# Patient Record
Sex: FEMALE | Race: WHITE | NOT HISPANIC OR LATINO
[De-identification: names, ages, dates, MRNs, and addresses within clinical notes are randomized per-mention and may not be internally consistent; named-entity substitution may affect disease eponyms.]

---

## 2024-02-28 ENCOUNTER — APPOINTMENT (OUTPATIENT)
Dept: UROGYNECOLOGY | Facility: CLINIC | Age: 79
End: 2024-02-28
Payer: MEDICARE

## 2024-02-28 DIAGNOSIS — Z87.19 PERSONAL HISTORY OF OTHER DISEASES OF THE DIGESTIVE SYSTEM: ICD-10-CM

## 2024-02-28 DIAGNOSIS — R32 UNSPECIFIED URINARY INCONTINENCE: ICD-10-CM

## 2024-02-28 DIAGNOSIS — N81.10 CYSTOCELE, UNSPECIFIED: ICD-10-CM

## 2024-02-28 DIAGNOSIS — E78.5 HYPERLIPIDEMIA, UNSPECIFIED: ICD-10-CM

## 2024-02-28 PROBLEM — Z00.00 ENCOUNTER FOR PREVENTIVE HEALTH EXAMINATION: Status: ACTIVE | Noted: 2024-02-28

## 2024-02-28 PROCEDURE — 51701 INSERT BLADDER CATHETER: CPT

## 2024-02-28 PROCEDURE — 99204 OFFICE O/P NEW MOD 45 MIN: CPT | Mod: 25,57

## 2024-02-28 NOTE — OB HISTORY
[Total Preg ___] : : [unfilled] [Full Term ___] : [unfilled] (full-term) [Living ___] : [unfilled] (living) [Vaginal ___] : [unfilled] vaginal delivery(s) [Approximately ___ (Month)] : the LMP was approximately [unfilled] month(s) ago [Abnormal Pap Smear] : normal pap smear [Taking Estrogens] : is not taking estrogen replacement [Abnormal Bleeding] : without bleeding [Sexually Active] : sexually active [Very Satisfied] : very satisfied

## 2024-02-28 NOTE — PROCEDURE
[Straight Catheterization] : insertion of a straight catheter [Urinary Retention] : urinary retention [Stress Incontinence] : stress incontinence [Patient] : the patient [Clear] : clear [Culture] : culture [Urinalysis] : urinalysis [No Complications] : no complications [Tolerated Well] : the patient tolerated the procedure well [Post procedure instructions and information given] : Post procedure instructions and information were given and reviewed with patient. [0] : 0 [Good Fit] : fits well [Refit] : refit is not needed [Erosion] : no evidence of erosion [Erythema] : erythema noted [Discharge] : no vaginal discharge [Pessary Inserted] : inserted [Infection] : no evidence of infection [Pessary Washed] : washed [H2O] : H2O [None] : no bleeding [Straight Cath Performed] : a straight catheterization was performed to obtain the residual volume [Residual Volume ___] : the final residual volume was [unfilled] cc [FreeTextEntry1] : ring with  support size 6 [FreeTextEntry8] : pt self mgmt - desires surgery

## 2024-02-28 NOTE — HISTORY OF PRESENT ILLNESS
[FreeTextEntry1] : The patient is a 78 year P2 with complaints of pop  and ui  started 20 y ago wearing pessary  self mgmt She   urinary leakage occ wears liners She feels complete bladder emptying sometimes She voids every _7_d ____ volume. 1-3nocturia.  Her liquid intake consists of tea, water 7 glasses and social drinking wine She has a BM q daily  incomplete emptying She denies gross hematuria, hx of nephrolithiasis, vaginal bulge or recurrent UTIs Her last pap was 15   The last time she has intercourse was this week Denies abdominal surgical history She has no history of bruising, excessive bleeding, peteciae.

## 2024-02-28 NOTE — REASON FOR VISIT
[Initial Visit ___] : an initial visit for [unfilled] [Questionnaire Received] : Patient questionnaire received [Intake Form Reviewed] : Patient intake form with past medical history, surgical history, family history and social history reviewed today [Urinary Incontinence] : urinary incontinence [Pessary] : pessary [Pelvic Organ Prolapse] : pelvic organ prolapse

## 2024-02-28 NOTE — PHYSICAL EXAM
[Chaperone Present] : A chaperone was present in the examining room during all aspects of the physical examination [FreeTextEntry1] : [place] [No Acute Distress] : in acute distress [Well developed] : well developed [Well Nourished] : ~L well nourished [Good Hygeine] : demonstrates good hygeine [Oriented x3] : oriented to person, place, and time [Normal Memory] : ~T memory was ~L unimpaired [Normal Mood/Affect] : mood and affect are normal [Anxiety] : patient is not anxious [Normal sensory exam] : sensory exam was normal [Normal Lung Sounds] : the lungs were clear to auscultation [Respirations regular] : ~T respiratory rate was regular [Cough] : no cough [Dyspnea] : no dyspnea [Rate & Rhythm Regular] : ~T heart rate and rhythm were normal [No Edema] : ~T edema was present [Varicose Veins] : no varicose veins observed [Murmurs] : no murmurs were heard [Mass (___ Cm)] : no ~M [unfilled] abdominal mass was palpated [Tenderness] : ~T no ~M abdominal tenderness observed [Distended] : not distended [H/Smegaly] : no hepatosplenomegaly [Hernia] : no hernia observed [Scar] : no scars [Warm and Dry] : was warm and dry to touch [No Lesions] : no lesions were seen on the external genitalia [Labia Majora] : were normal [Labia Minora] : were normal [Bartholin's Gland] : both Bartholin's glands were normal  [Normal Appearance] : general appearance was normal [Atrophy] : atrophy [Cystocele] : a cystocele [Enterocele] : an enterocele [Uterine Prolapse] : uterine prolapse [No Bleeding] : there was no active vaginal bleeding [Aa ____] : Aa [unfilled] [Ba ____] : Ba [unfilled] [C ____] : C [unfilled] [GH ____] : GH [unfilled] [PB ____] : PB [unfilled] [TVL ____] : TVL  [unfilled] [Ap ____] : Ap [unfilled] [Bp ____] : Bp [unfilled] [D ____] : D [unfilled] [] : II [Normal] : normal [Normal Position] : in a normal position [Soft] :  the cervix was soft [Post Void Residual ____ml] : post void residual was [unfilled] ml [Uterine Adnexae] : were not tender and not enlarged [de-identified] : 120cc

## 2024-02-28 NOTE — DISCUSSION/SUMMARY
[FreeTextEntry1] : 78yp2 stage 3 pop and UI  desires surgery  very  active 78 y  no  signficant med prob and no abd surgery  rtc for urd reviewed pop We reviewed management options for her prolapse including: observation, pelvic floor exercises with and without PT, pessary, and surgical management. We reviewed various surgical management options including vaginal, laparoscopic/robotic, abdominal procedures. We reviewed procedures involving hysterectomy as well as uterine sparing procedures. We also reviewed both mesh and non-mesh options. Written IUGA information on prolapse treatment options was also provided to her to review.   recn lsc lashawn colpopexy

## 2024-02-29 LAB
APPEARANCE: ABNORMAL
BACTERIA: NEGATIVE /HPF
BILIRUBIN URINE: NEGATIVE
BLOOD URINE: NEGATIVE
CAST: 0 /LPF
COLOR: YELLOW
EPITHELIAL CELLS: 0 /HPF
GLUCOSE QUALITATIVE U: NEGATIVE MG/DL
KETONES URINE: NEGATIVE MG/DL
LEUKOCYTE ESTERASE URINE: NEGATIVE
MICROSCOPIC-UA: NORMAL
NITRITE URINE: NEGATIVE
PH URINE: 6.5
PROTEIN URINE: NEGATIVE MG/DL
RED BLOOD CELLS URINE: 0 /HPF
SPECIFIC GRAVITY URINE: 1.01
UROBILINOGEN URINE: 0.2 MG/DL
WHITE BLOOD CELLS URINE: 0 /HPF

## 2024-03-04 PROBLEM — N39.0 ACUTE UTI: Status: ACTIVE | Noted: 2024-03-04

## 2024-03-04 LAB — BACTERIA UR CULT: ABNORMAL

## 2024-03-07 ENCOUNTER — APPOINTMENT (OUTPATIENT)
Dept: UROGYNECOLOGY | Facility: CLINIC | Age: 79
End: 2024-03-07

## 2024-03-07 DIAGNOSIS — N39.0 URINARY TRACT INFECTION, SITE NOT SPECIFIED: ICD-10-CM

## 2024-03-19 ENCOUNTER — APPOINTMENT (OUTPATIENT)
Dept: UROGYNECOLOGY | Facility: CLINIC | Age: 79
End: 2024-03-19

## 2024-03-19 ENCOUNTER — APPOINTMENT (OUTPATIENT)
Dept: UROGYNECOLOGY | Facility: CLINIC | Age: 79
End: 2024-03-19
Payer: MEDICARE

## 2024-03-19 VITALS
WEIGHT: 125 LBS | SYSTOLIC BLOOD PRESSURE: 124 MMHG | HEART RATE: 82 BPM | DIASTOLIC BLOOD PRESSURE: 75 MMHG | OXYGEN SATURATION: 97 %

## 2024-03-19 DIAGNOSIS — R33.9 RETENTION OF URINE, UNSPECIFIED: ICD-10-CM

## 2024-03-19 PROCEDURE — 51797 INTRAABDOMINAL PRESSURE TEST: CPT

## 2024-03-19 PROCEDURE — 51741 ELECTRO-UROFLOWMETRY FIRST: CPT

## 2024-03-19 PROCEDURE — 51798 US URINE CAPACITY MEASURE: CPT

## 2024-03-19 PROCEDURE — 51784 ANAL/URINARY MUSCLE STUDY: CPT

## 2024-03-19 PROCEDURE — 51729 CYSTOMETROGRAM W/VP&UP: CPT

## 2024-03-20 PROBLEM — R33.9 INCOMPLETE EMPTYING OF BLADDER: Status: ACTIVE | Noted: 2024-03-20

## 2024-03-29 ENCOUNTER — APPOINTMENT (OUTPATIENT)
Dept: UROGYNECOLOGY | Facility: CLINIC | Age: 79
End: 2024-03-29

## 2024-04-02 ENCOUNTER — APPOINTMENT (OUTPATIENT)
Dept: UROGYNECOLOGY | Facility: CLINIC | Age: 79
End: 2024-04-02
Payer: MEDICARE

## 2024-04-02 LAB — BACTERIA UR CULT: ABNORMAL

## 2024-04-02 PROCEDURE — 99213 OFFICE O/P EST LOW 20 MIN: CPT

## 2024-04-02 NOTE — ASSESSMENT
[FreeTextEntry1] : 78y hx if stage 3 pop  very  symptomatic desires surgery   plan for  lsc lashawn colpopexy    may 8th for fu pending med clearance  next visit  check  pap  gave preop and post op onstructions    review rbjayden

## 2024-04-02 NOTE — HISTORY OF PRESENT ILLNESS
[FreeTextEntry1] : Patient is a 78 F with Stage 3 prolapse here for pre-surgical counseling.  Patient denies any new complaints.  She continues to desire surgical management.  Her pre-operative workup is as follows:  UDS (): [id number], +CST namL, none DO, PVR 250cccc. Pelvic US pending results  Pap neg hx negative for intraepithelial lesion or malignancy  PMH: dyslipidemia  ibs PSH: none

## 2024-04-18 ENCOUNTER — APPOINTMENT (OUTPATIENT)
Dept: INTERNAL MEDICINE | Facility: CLINIC | Age: 79
End: 2024-04-18
Payer: MEDICARE

## 2024-04-18 VITALS
WEIGHT: 125 LBS | HEART RATE: 77 BPM | DIASTOLIC BLOOD PRESSURE: 76 MMHG | BODY MASS INDEX: 18.94 KG/M2 | TEMPERATURE: 97.2 F | HEIGHT: 68 IN | OXYGEN SATURATION: 95 % | SYSTOLIC BLOOD PRESSURE: 116 MMHG

## 2024-04-18 DIAGNOSIS — Z01.818 ENCOUNTER FOR OTHER PREPROCEDURAL EXAMINATION: ICD-10-CM

## 2024-04-18 PROCEDURE — G2211 COMPLEX E/M VISIT ADD ON: CPT

## 2024-04-18 PROCEDURE — 36415 COLL VENOUS BLD VENIPUNCTURE: CPT

## 2024-04-18 PROCEDURE — 99204 OFFICE O/P NEW MOD 45 MIN: CPT

## 2024-04-18 RX ORDER — SULFAMETHOXAZOLE AND TRIMETHOPRIM 800; 160 MG/1; MG/1
800-160 TABLET ORAL TWICE DAILY
Qty: 10 | Refills: 0 | Status: DISCONTINUED | COMMUNITY
Start: 2024-04-02 | End: 2024-04-18

## 2024-04-18 RX ORDER — SULFAMETHOXAZOLE AND TRIMETHOPRIM 800; 160 MG/1; MG/1
800-160 TABLET ORAL
Qty: 6 | Refills: 11 | Status: DISCONTINUED | COMMUNITY
Start: 2024-03-04 | End: 2024-04-18

## 2024-04-18 NOTE — HISTORY OF PRESENT ILLNESS
[No Pertinent Cardiac History] : no history of aortic stenosis, atrial fibrillation, coronary artery disease, recent myocardial infarction, or implantable device/pacemaker [No Pertinent Pulmonary History] : no history of asthma, COPD, sleep apnea, or smoking [No Adverse Anesthesia Reaction] : no adverse anesthesia reaction in self or family member [(Patient denies any chest pain, claudication, dyspnea on exertion, orthopnea, palpitations or syncope)] : Patient denies any chest pain, claudication, dyspnea on exertion, orthopnea, palpitations or syncope [Good (7-10 METs)] : Good (7-10 METs) [Chronic Anticoagulation] : no chronic anticoagulation [Chronic Kidney Disease] : no chronic kidney disease [Diabetes] : no diabetes [FreeTextEntry1] : hystrectomy [FreeTextEntry2] : 4/13/2024 [FreeTextEntry3] : Dr. Silva [FreeTextEntry4] : RCRI - Class 2 risk - 6% 30 day risk of death, MI or cardiac arrest Manpreet kam operative risk - .1% risk of MI or cardiac arrest, intraoperatively or upto 30 days post op METS>4

## 2024-04-18 NOTE — PHYSICAL EXAM
[No Acute Distress] : no acute distress [Well Nourished] : well nourished [Well Developed] : well developed [Well-Appearing] : well-appearing [Normal Sclera/Conjunctiva] : normal sclera/conjunctiva [PERRL] : pupils equal round and reactive to light [EOMI] : extraocular movements intact [Normal Outer Ear/Nose] : the outer ears and nose were normal in appearance [Normal Oropharynx] : the oropharynx was normal [No JVD] : no jugular venous distention [No Lymphadenopathy] : no lymphadenopathy [Supple] : supple [No Respiratory Distress] : no respiratory distress  [No Accessory Muscle Use] : no accessory muscle use [Clear to Auscultation] : lungs were clear to auscultation bilaterally [Normal Rate] : normal rate  [Regular Rhythm] : with a regular rhythm [Normal S1, S2] : normal S1 and S2 [No Murmur] : no murmur heard [Soft] : abdomen soft [Non Tender] : non-tender [Non-distended] : non-distended [No Masses] : no abdominal mass palpated [No HSM] : no HSM [Normal Bowel Sounds] : normal bowel sounds [Normal Posterior Cervical Nodes] : no posterior cervical lymphadenopathy [Normal Anterior Cervical Nodes] : no anterior cervical lymphadenopathy [No CVA Tenderness] : no CVA  tenderness [No Spinal Tenderness] : no spinal tenderness [No Joint Swelling] : no joint swelling [Grossly Normal Strength/Tone] : grossly normal strength/tone [No Rash] : no rash [Coordination Grossly Intact] : coordination grossly intact [No Focal Deficits] : no focal deficits [Normal Gait] : normal gait [Normal Affect] : the affect was normal [Alert and Oriented x3] : oriented to person, place, and time [Normal Insight/Judgement] : insight and judgment were intact

## 2024-04-18 NOTE — ASSESSMENT
[Patient Optimized for Surgery] : Patient optimized for surgery [No Further Testing Recommended] : no further testing recommended [As per surgery] : as per surgery [FreeTextEntry4] :  if the labs and EKG are within normal limits, patient is low risk for low to moderate risk procedure.  She is not taking any medications.

## 2024-04-19 LAB
ABO + RH PNL BLD: NORMAL
APPEARANCE: CLEAR
APTT BLD: 36 SEC
BACTERIA: NEGATIVE /HPF
BILIRUBIN URINE: NEGATIVE
BLOOD URINE: NEGATIVE
CAST: 0 /LPF
COLOR: YELLOW
EPITHELIAL CELLS: 2 /HPF
GLUCOSE QUALITATIVE U: NEGATIVE MG/DL
INR PPP: 0.92 RATIO
KETONES URINE: NEGATIVE MG/DL
LEUKOCYTE ESTERASE URINE: ABNORMAL
MICROSCOPIC-UA: NORMAL
NITRITE URINE: NEGATIVE
PH URINE: 6.5
PROTEIN URINE: NEGATIVE MG/DL
PT BLD: 10.4 SEC
RED BLOOD CELLS URINE: 0 /HPF
SPECIFIC GRAVITY URINE: 1.01
UROBILINOGEN URINE: 0.2 MG/DL
WHITE BLOOD CELLS URINE: 1 /HPF

## 2024-04-22 LAB — BACTERIA UR CULT: NORMAL

## 2024-05-08 ENCOUNTER — APPOINTMENT (OUTPATIENT)
Dept: UROGYNECOLOGY | Facility: CLINIC | Age: 79
End: 2024-05-08
Payer: MEDICARE

## 2024-05-08 VITALS — OXYGEN SATURATION: 96 % | SYSTOLIC BLOOD PRESSURE: 120 MMHG | DIASTOLIC BLOOD PRESSURE: 69 MMHG | HEART RATE: 82 BPM

## 2024-05-08 DIAGNOSIS — N81.2 INCOMPLETE UTEROVAGINAL PROLAPSE: ICD-10-CM

## 2024-05-08 PROCEDURE — 99214 OFFICE O/P EST MOD 30 MIN: CPT

## 2024-05-08 NOTE — PROCEDURE
[Good Fit] : fits well [Pessary Inserted] : inserted [None] : no bleeding [FreeTextEntry1] : ring with support  [FreeTextEntry8] : rec to remove pesary  on may 10th prior to surgery

## 2024-05-08 NOTE — ASSESSMENT
[FreeTextEntry1] : 78 y    stage 3 pop   plan for lsc schcolpopexy  bs  poss apr poss open  dw rba of the surgery  denies  uti sx   per patient she had sonogram

## 2024-05-08 NOTE — HISTORY OF PRESENT ILLNESS
[FreeTextEntry1] : 78y p2 with  hx of stage 3 pop  used pessary  for several  years  desires  surgery  specfically  lashawn bs colpopexy  presents for preoperative counseling. We reviewed management options for her prolapse including: observation, pelvic floor exercises, pessary, surgical management. We reviewed various surgical management options including vaginal, laparoscopic/robotic, abdominal procedures. We also reviewed both mesh and non-mesh options. She continues to be interested in lsc supracervical hysterectomy with sacral colpopexy. We discussed her URD findings of    She denies a history of abnormal Pap smears in the past and a recent Pap smear is negative. She is aware that her cervix will be left in situ and she will require continued routine screening. We discussed the risks and benefits of removing her ovaries at the time of surgery and she would like to keep her ovaries in situ. she would like to proceed with a bilateral salpingectomy at the time of the procedure. we reviewed risks of the surgery including but not limited to: bleeding, infection, pain, urinary retention, persistence or worsening of stress urinary incontinence, development of urge incontinence, voiding dysfunction, mesh erosion, failure to reduce prolapse, prolapse recurrence, dyspareunia, and vaginal shortening. we discussed the risk of laparotomy (having to make an incision). We discussed the risk of injury to her bladder, ureters, urethra, vagina, rectum and bowel. We discussed the risk of mesh erosion, fistula formation and neuropathy. The risks and procedure details were explained in layman's terms and she expressed understanding of all of these risks. She understands that mesh will be used and we reviewed the FDA notification on transvaginal mesh. A separate mesh specific consent form with procedure risks and mesh risks was reviewed and initialed by myself and the patient. This was witnessed and signed.   We discussed the elective nature of the surgery and we discussed that she is choosing to undergo this surgery despite awareness and understanding of procedure risks. She was counseled on alternative treatment options. She continues to desire procedure.    We reviewed her hospital stay as well as preoperative and postoperative instructions. She is aware that she must be NPO after midnight prior to the surgery.     Indications for procedure: POP, occasional positional (standing) UI  ml Patient wearing pessary on arrival.   Cystometrogram: Test was performed with the patient in a sitting position and via single catheter in the vagina  Bladder residual volume was 250 ml.     Findings: First urge: 26 ml.  Fullness: 101 ml.  jail: 400 ml.   Involuntary Detrusor Contraction:. no overactivity.   Cough Stress Test: Test was performed with patient in a sitting position.  Pelvic prolapse was reduced with pessary     200ml: negative at cough 128/ valsalva 70 cm.   300ml: negative at cough cm.   400ml: negative at cough 124/ valsalva 84 cm. Stress test also performed standing at 400 ml fill. negative stress test with cough or valsalva.   Pressure Voiding Study: Volume at the start of the study was 400 ml.   Detrusor contraction was observed at 26 cm H2O with catheter in place.   Maximum flow rate was 9.9 ml/min with catheters in place.   The volume voided spontaneously and without maneuvers was 201 ml with catheters in place. The final PVR was 200 ml.   EMG:. Response to fill showed no change. Response to cough was increased. Response to void was increased.   URODYNAMIC FINDINGS: Incomplete bladder emptying Pelvic Prolaspe Intermittant urine stream

## 2024-05-08 NOTE — DISCUSSION/SUMMARY
[Reviewed Clinical Lab Test(s)] : Results of clinical tests were reviewed. [Discuss Alternatives/Risks/Benefits w/Patient] : All alternatives, risks, and benefits were discussed with the patient/family and all questions were answered.  Patient expressed good understanding and appreciates the importance of follow up as recommended. [FreeTextEntry1] : awaiting pelvic sonogram ucx

## 2024-05-08 NOTE — PHYSICAL EXAM
[Chaperone Declined] : Patient declined chaperone [No Acute Distress] : in acute distress [Well developed] : well developed [Well Nourished] : ~L well nourished [Good Hygeine] : demonstrates good hygeine [Oriented x3] : oriented to person, place, and time [Normal Memory] : ~T memory was ~L unimpaired [Normal Mood/Affect] : mood and affect are normal [Anxiety] : patient is anxious [Normal Lung Sounds] : the lungs were clear to auscultation [Respirations regular] : ~T respiratory rate was regular [Cough] : no cough [Dyspnea] : no dyspnea [Rate & Rhythm Regular] : ~T heart rate and rhythm were normal [No Edema] : ~T edema was not present [Murmurs] : a ~P ~M murmur was heard [Varicose Veins] : varicose veins observed [Mass (___ Cm)] : no ~M [unfilled] abdominal mass was palpated [Tenderness] : ~T no ~M abdominal tenderness observed [Distended] : not distended [H/Smegaly] : no hepatosplenomegaly [Hernia] : no hernia observed [Scar] : no scars [No Lesions] : no lesions were seen on the external genitalia [Labia Majora] : were normal [Labia Minora] : were normal [Bartholin's Gland] : both Bartholin's glands were normal  [Normal] : normal [FreeTextEntry4] : pesary  removed and reinserted no erosions  [de-identified] : stage 3

## 2024-05-10 NOTE — ASU PATIENT PROFILE, ADULT - NSICDXPASTMEDICALHX_GEN_ALL_CORE_FT
PAST MEDICAL HISTORY:  Cystocele with prolapse     Dyslipidemia     History of IBS     Uterovaginal prolapse

## 2024-05-10 NOTE — ASU PATIENT PROFILE, ADULT - NSICDXPASTSURGICALHX_GEN_ALL_CORE_FT
PAST SURGICAL HISTORY:  History of back surgery BROKEN SPINE-LOWER    History of hip surgery LEFT BROKEN HIP

## 2024-05-12 ENCOUNTER — TRANSCRIPTION ENCOUNTER (OUTPATIENT)
Age: 79
End: 2024-05-12

## 2024-05-13 ENCOUNTER — RESULT REVIEW (OUTPATIENT)
Age: 79
End: 2024-05-13

## 2024-05-13 ENCOUNTER — APPOINTMENT (OUTPATIENT)
Dept: UROGYNECOLOGY | Facility: HOSPITAL | Age: 79
End: 2024-05-13

## 2024-05-13 ENCOUNTER — INPATIENT (INPATIENT)
Facility: HOSPITAL | Age: 79
LOS: 0 days | Discharge: HOME | End: 2024-05-14
Attending: OBSTETRICS & GYNECOLOGY | Admitting: OBSTETRICS & GYNECOLOGY
Payer: MEDICARE

## 2024-05-13 ENCOUNTER — TRANSCRIPTION ENCOUNTER (OUTPATIENT)
Age: 79
End: 2024-05-13

## 2024-05-13 VITALS
HEART RATE: 90 BPM | WEIGHT: 123.46 LBS | TEMPERATURE: 97 F | OXYGEN SATURATION: 97 % | DIASTOLIC BLOOD PRESSURE: 80 MMHG | HEIGHT: 68 IN | RESPIRATION RATE: 16 BRPM | SYSTOLIC BLOOD PRESSURE: 138 MMHG

## 2024-05-13 DIAGNOSIS — Z98.890 OTHER SPECIFIED POSTPROCEDURAL STATES: Chronic | ICD-10-CM

## 2024-05-13 LAB
BLD GP AB SCN SERPL QL: NEGATIVE — SIGNIFICANT CHANGE UP
GLUCOSE BLDC GLUCOMTR-MCNC: 91 MG/DL — SIGNIFICANT CHANGE UP (ref 70–99)
RH IG SCN BLD-IMP: POSITIVE — SIGNIFICANT CHANGE UP

## 2024-05-13 PROCEDURE — 58542 LSH W/T/O UT 250 G OR LESS: CPT

## 2024-05-13 PROCEDURE — 88307 TISSUE EXAM BY PATHOLOGIST: CPT | Mod: 26

## 2024-05-13 PROCEDURE — 57250 REPAIR RECTUM & VAGINA: CPT

## 2024-05-13 PROCEDURE — 57425 LAPAROSCOPY SURG COLPOPEXY: CPT

## 2024-05-13 PROCEDURE — 88342 IMHCHEM/IMCYTCHM 1ST ANTB: CPT | Mod: 26

## 2024-05-13 PROCEDURE — 88304 TISSUE EXAM BY PATHOLOGIST: CPT | Mod: 26

## 2024-05-13 PROCEDURE — 88305 TISSUE EXAM BY PATHOLOGIST: CPT | Mod: 26

## 2024-05-13 DEVICE — INTERCEED 3 X 4"
Type: IMPLANTABLE DEVICE | Status: NON-FUNCTIONAL
Removed: 2024-05-13

## 2024-05-13 DEVICE — MESH UPSYLON Y
Type: IMPLANTABLE DEVICE | Status: NON-FUNCTIONAL
Removed: 2024-05-13

## 2024-05-13 DEVICE — COVIDIEN PROTACK FIXATION DEVICE
Type: IMPLANTABLE DEVICE | Status: NON-FUNCTIONAL
Removed: 2024-05-13

## 2024-05-13 RX ORDER — SODIUM CHLORIDE 9 MG/ML
1000 INJECTION, SOLUTION INTRAVENOUS
Refills: 0 | Status: DISCONTINUED | OUTPATIENT
Start: 2024-05-13 | End: 2024-05-14

## 2024-05-13 RX ORDER — METOCLOPRAMIDE HCL 10 MG
10 TABLET ORAL EVERY 6 HOURS
Refills: 0 | Status: DISCONTINUED | OUTPATIENT
Start: 2024-05-13 | End: 2024-05-14

## 2024-05-13 RX ORDER — ONDANSETRON 8 MG/1
8 TABLET, FILM COATED ORAL EVERY 6 HOURS
Refills: 0 | Status: DISCONTINUED | OUTPATIENT
Start: 2024-05-13 | End: 2024-05-14

## 2024-05-13 RX ORDER — HEPARIN SODIUM 5000 [USP'U]/ML
5000 INJECTION INTRAVENOUS; SUBCUTANEOUS ONCE
Refills: 0 | Status: COMPLETED | OUTPATIENT
Start: 2024-05-13 | End: 2024-05-13

## 2024-05-13 RX ORDER — OXYCODONE HYDROCHLORIDE 5 MG/1
10 TABLET ORAL EVERY 6 HOURS
Refills: 0 | Status: DISCONTINUED | OUTPATIENT
Start: 2024-05-13 | End: 2024-05-14

## 2024-05-13 RX ORDER — OXYCODONE HYDROCHLORIDE 5 MG/1
5 TABLET ORAL EVERY 6 HOURS
Refills: 0 | Status: DISCONTINUED | OUTPATIENT
Start: 2024-05-13 | End: 2024-05-14

## 2024-05-13 RX ORDER — HYDROMORPHONE HYDROCHLORIDE 2 MG/ML
0.2 INJECTION INTRAMUSCULAR; INTRAVENOUS; SUBCUTANEOUS
Refills: 0 | Status: DISCONTINUED | OUTPATIENT
Start: 2024-05-13 | End: 2024-05-14

## 2024-05-13 RX ORDER — IBUPROFEN 200 MG
600 TABLET ORAL EVERY 6 HOURS
Refills: 0 | Status: DISCONTINUED | OUTPATIENT
Start: 2024-05-13 | End: 2024-05-14

## 2024-05-13 RX ORDER — CELECOXIB 200 MG/1
400 CAPSULE ORAL ONCE
Refills: 0 | Status: COMPLETED | OUTPATIENT
Start: 2024-05-13 | End: 2024-05-13

## 2024-05-13 RX ORDER — ACETAMINOPHEN 500 MG
1000 TABLET ORAL EVERY 6 HOURS
Refills: 0 | Status: DISCONTINUED | OUTPATIENT
Start: 2024-05-13 | End: 2024-05-14

## 2024-05-13 RX ORDER — KETOROLAC TROMETHAMINE 30 MG/ML
15 SYRINGE (ML) INJECTION EVERY 6 HOURS
Refills: 0 | Status: COMPLETED | OUTPATIENT
Start: 2024-05-13 | End: 2024-05-14

## 2024-05-13 RX ORDER — ACETAMINOPHEN 500 MG
1000 TABLET ORAL ONCE
Refills: 0 | Status: COMPLETED | OUTPATIENT
Start: 2024-05-13 | End: 2024-05-13

## 2024-05-13 RX ORDER — SIMETHICONE 80 MG/1
80 TABLET, CHEWABLE ORAL EVERY 6 HOURS
Refills: 0 | Status: DISCONTINUED | OUTPATIENT
Start: 2024-05-13 | End: 2024-05-14

## 2024-05-13 RX ADMIN — HEPARIN SODIUM 5000 UNIT(S): 5000 INJECTION INTRAVENOUS; SUBCUTANEOUS at 11:50

## 2024-05-13 RX ADMIN — CELECOXIB 400 MILLIGRAM(S): 200 CAPSULE ORAL at 11:50

## 2024-05-13 RX ADMIN — SODIUM CHLORIDE 125 MILLILITER(S): 9 INJECTION, SOLUTION INTRAVENOUS at 19:32

## 2024-05-13 RX ADMIN — Medication 1000 MILLIGRAM(S): at 11:50

## 2024-05-13 NOTE — H&P ADULT - ASSESSMENT
POD0 s/p l/s NIESHA BS, sacrocolpopexy, intercede, posterior rectocele repair, cysto w/ jets b/l, methylene blue bladder backfill    IN 2100        Admit overnight for monitoring  Will complete post-op check in 4 hours

## 2024-05-13 NOTE — BRIEF OPERATIVE NOTE - NSICDXBRIEFPROCEDURE_GEN_ALL_CORE_FT
PROCEDURES:  Laparoscopic supracervical hysterectomy with salpingectomy, sacrocolpopexy, and cystoscopy 13-May-2024 18:39:30  Karey Garner  Posterior repair 13-May-2024 18:39:40  Karey Garner

## 2024-05-13 NOTE — PRE-ANESTHESIA EVALUATION ADULT - NSANTHDIETYNSD_GEN_ALL_CORE
Pt discharge RN Note    RN at bedside. Pt given discharge instructions and MD follow up instructions. Pt verbalized understanding. All questions answered. Pt discharged via wheelchair in stable condition.    Yes

## 2024-05-13 NOTE — BRIEF OPERATIVE NOTE - NSICDXBRIEFPREOP_GEN_ALL_CORE_FT
PRE-OP DIAGNOSIS:  Uterine prolapse 13-May-2024 18:39:53  Karey Garner  Urinary, incontinence, stress female 13-May-2024 18:40:26  Karey Garner   Never

## 2024-05-13 NOTE — BRIEF OPERATIVE NOTE - OPERATION/FINDINGS
Chlorhexidine prep - abdominal and vaginal. Umbilical port 10mm closed at end of case with vicryl via Granny needle. R and L mid abdominal ports placed under direct visualization. Lower R pelvic port placed under direct visualization 10mm also closed with vicryl at end of case.   L/s NIESHA BS completed with no difficulty. R ureter visualized throughout the case. Methylene blue used to distend the bladder. Removed via endocatch 10mm bad at end of case.   Sacrocolpopexy completed with mesh. Intercede used for adhesion prophylaxis.   Posterior rectocele repair noted and repaired with vicryl suture. Uncomplicated. Orourke left in place.

## 2024-05-13 NOTE — H&P ADULT - HISTORY OF PRESENT ILLNESS
77yo w/ stage III prolapse + cystocele x 15 years with bulging and pressure symptoms    OBhx:   x 2  GYNHx: no fibroids.cysts, abnml paps, STI  NKDA  PMH: denies  Meds: none  SxHx: L femur orif , kyphoplasty   SH: denies

## 2024-05-14 ENCOUNTER — TRANSCRIPTION ENCOUNTER (OUTPATIENT)
Age: 79
End: 2024-05-14

## 2024-05-14 VITALS
RESPIRATION RATE: 18 BRPM | SYSTOLIC BLOOD PRESSURE: 109 MMHG | DIASTOLIC BLOOD PRESSURE: 52 MMHG | OXYGEN SATURATION: 95 % | TEMPERATURE: 99 F | HEART RATE: 73 BPM

## 2024-05-14 PROBLEM — E78.5 HYPERLIPIDEMIA, UNSPECIFIED: Chronic | Status: ACTIVE | Noted: 2024-05-10

## 2024-05-14 PROBLEM — Z87.19 PERSONAL HISTORY OF OTHER DISEASES OF THE DIGESTIVE SYSTEM: Chronic | Status: ACTIVE | Noted: 2024-05-10

## 2024-05-14 PROBLEM — N81.4 UTEROVAGINAL PROLAPSE, UNSPECIFIED: Chronic | Status: ACTIVE | Noted: 2024-05-10

## 2024-05-14 LAB
ANION GAP SERPL CALC-SCNC: 9 MMOL/L — SIGNIFICANT CHANGE UP (ref 5–17)
BASOPHILS # BLD AUTO: 0.01 K/UL — SIGNIFICANT CHANGE UP (ref 0–0.2)
BASOPHILS NFR BLD AUTO: 0.1 % — SIGNIFICANT CHANGE UP (ref 0–2)
BUN SERPL-MCNC: 10 MG/DL — SIGNIFICANT CHANGE UP (ref 7–23)
CALCIUM SERPL-MCNC: 9.6 MG/DL — SIGNIFICANT CHANGE UP (ref 8.4–10.5)
CHLORIDE SERPL-SCNC: 100 MMOL/L — SIGNIFICANT CHANGE UP (ref 96–108)
CO2 SERPL-SCNC: 26 MMOL/L — SIGNIFICANT CHANGE UP (ref 22–31)
CREAT SERPL-MCNC: 0.78 MG/DL — SIGNIFICANT CHANGE UP (ref 0.5–1.3)
EGFR: 78 ML/MIN/1.73M2 — SIGNIFICANT CHANGE UP
EOSINOPHIL # BLD AUTO: 0 K/UL — SIGNIFICANT CHANGE UP (ref 0–0.5)
EOSINOPHIL NFR BLD AUTO: 0 % — SIGNIFICANT CHANGE UP (ref 0–6)
GLUCOSE SERPL-MCNC: 108 MG/DL — HIGH (ref 70–99)
HCT VFR BLD CALC: 38.1 % — SIGNIFICANT CHANGE UP (ref 34.5–45)
HGB BLD-MCNC: 12 G/DL — SIGNIFICANT CHANGE UP (ref 11.5–15.5)
IMM GRANULOCYTES NFR BLD AUTO: 0.3 % — SIGNIFICANT CHANGE UP (ref 0–0.9)
LYMPHOCYTES # BLD AUTO: 1.22 K/UL — SIGNIFICANT CHANGE UP (ref 1–3.3)
LYMPHOCYTES # BLD AUTO: 8.4 % — LOW (ref 13–44)
MCHC RBC-ENTMCNC: 29.9 PG — SIGNIFICANT CHANGE UP (ref 27–34)
MCHC RBC-ENTMCNC: 31.5 GM/DL — LOW (ref 32–36)
MCV RBC AUTO: 95 FL — SIGNIFICANT CHANGE UP (ref 80–100)
MONOCYTES # BLD AUTO: 1.08 K/UL — HIGH (ref 0–0.9)
MONOCYTES NFR BLD AUTO: 7.4 % — SIGNIFICANT CHANGE UP (ref 2–14)
NEUTROPHILS # BLD AUTO: 12.25 K/UL — HIGH (ref 1.8–7.4)
NEUTROPHILS NFR BLD AUTO: 83.8 % — HIGH (ref 43–77)
NRBC # BLD: 0 /100 WBCS — SIGNIFICANT CHANGE UP (ref 0–0)
PLATELET # BLD AUTO: 225 K/UL — SIGNIFICANT CHANGE UP (ref 150–400)
POTASSIUM SERPL-MCNC: 4.2 MMOL/L — SIGNIFICANT CHANGE UP (ref 3.5–5.3)
POTASSIUM SERPL-SCNC: 4.2 MMOL/L — SIGNIFICANT CHANGE UP (ref 3.5–5.3)
RBC # BLD: 4.01 M/UL — SIGNIFICANT CHANGE UP (ref 3.8–5.2)
RBC # FLD: 13.4 % — SIGNIFICANT CHANGE UP (ref 10.3–14.5)
SODIUM SERPL-SCNC: 135 MMOL/L — SIGNIFICANT CHANGE UP (ref 135–145)
WBC # BLD: 14.6 K/UL — HIGH (ref 3.8–10.5)
WBC # FLD AUTO: 14.6 K/UL — HIGH (ref 3.8–10.5)

## 2024-05-14 PROCEDURE — 82962 GLUCOSE BLOOD TEST: CPT

## 2024-05-14 PROCEDURE — 85025 COMPLETE CBC W/AUTO DIFF WBC: CPT

## 2024-05-14 PROCEDURE — 58542 LSH W/T/O UT 250 G OR LESS: CPT

## 2024-05-14 PROCEDURE — C1781: CPT

## 2024-05-14 PROCEDURE — 86900 BLOOD TYPING SEROLOGIC ABO: CPT

## 2024-05-14 PROCEDURE — 86850 RBC ANTIBODY SCREEN: CPT

## 2024-05-14 PROCEDURE — 88342 IMHCHEM/IMCYTCHM 1ST ANTB: CPT

## 2024-05-14 PROCEDURE — 88305 TISSUE EXAM BY PATHOLOGIST: CPT

## 2024-05-14 PROCEDURE — 88304 TISSUE EXAM BY PATHOLOGIST: CPT

## 2024-05-14 PROCEDURE — 80048 BASIC METABOLIC PNL TOTAL CA: CPT

## 2024-05-14 PROCEDURE — 88360 TUMOR IMMUNOHISTOCHEM/MANUAL: CPT

## 2024-05-14 PROCEDURE — 88307 TISSUE EXAM BY PATHOLOGIST: CPT

## 2024-05-14 PROCEDURE — 36415 COLL VENOUS BLD VENIPUNCTURE: CPT

## 2024-05-14 PROCEDURE — C1889: CPT

## 2024-05-14 PROCEDURE — 86901 BLOOD TYPING SEROLOGIC RH(D): CPT

## 2024-05-14 PROCEDURE — 88341 IMHCHEM/IMCYTCHM EA ADD ANTB: CPT

## 2024-05-14 PROCEDURE — 57425 LAPAROSCOPY SURG COLPOPEXY: CPT

## 2024-05-14 PROCEDURE — C1765: CPT

## 2024-05-14 PROCEDURE — 57250 REPAIR RECTUM & VAGINA: CPT

## 2024-05-14 RX ORDER — PANTOPRAZOLE SODIUM 20 MG/1
20 TABLET, DELAYED RELEASE ORAL EVERY 12 HOURS
Refills: 0 | Status: DISCONTINUED | OUTPATIENT
Start: 2024-05-14 | End: 2024-05-14

## 2024-05-14 RX ORDER — IBUPROFEN 200 MG
1 TABLET ORAL
Qty: 0 | Refills: 0 | DISCHARGE
Start: 2024-05-14

## 2024-05-14 RX ORDER — SIMETHICONE 80 MG/1
1 TABLET, CHEWABLE ORAL
Qty: 0 | Refills: 0 | DISCHARGE
Start: 2024-05-14

## 2024-05-14 RX ORDER — ACETAMINOPHEN 500 MG
2 TABLET ORAL
Qty: 0 | Refills: 0 | DISCHARGE
Start: 2024-05-14

## 2024-05-14 RX ADMIN — SIMETHICONE 80 MILLIGRAM(S): 80 TABLET, CHEWABLE ORAL at 00:29

## 2024-05-14 RX ADMIN — SODIUM CHLORIDE 125 MILLILITER(S): 9 INJECTION, SOLUTION INTRAVENOUS at 02:48

## 2024-05-14 RX ADMIN — SIMETHICONE 80 MILLIGRAM(S): 80 TABLET, CHEWABLE ORAL at 05:59

## 2024-05-14 RX ADMIN — Medication 15 MILLIGRAM(S): at 05:59

## 2024-05-14 RX ADMIN — Medication 1000 MILLIGRAM(S): at 00:29

## 2024-05-14 RX ADMIN — Medication 1000 MILLIGRAM(S): at 05:59

## 2024-05-14 RX ADMIN — Medication 15 MILLIGRAM(S): at 00:28

## 2024-05-14 NOTE — DISCHARGE NOTE PROVIDER - CARE PROVIDER_API CALL
Allison Silva  Obstetrics and Gynecology  [address] [place] [address], [facility] [place] [address]  Phone: [phone]  Fax: [phone]  Follow Up Time:

## 2024-05-14 NOTE — DISCHARGE NOTE NURSING/CASE MANAGEMENT/SOCIAL WORK - NSDCPEFALRISK_GEN_ALL_CORE
For information on Fall & Injury Prevention, visit: [email or web address] OR  [email or web address] OR  https://www.cdc.gov/steadi/patient.html

## 2024-05-14 NOTE — PROGRESS NOTE ADULT - ASSESSMENT
78 s/p l/s NIESHA BS, sacrocolpopexy, intercede, posterior rectocele repair, cysto w/ jets b/l, methylene blue bladder backfill    Neuro: Tylenol/Toradol 15 ATC, Oxy 5/10PRN  Cards: euvolemic  Resp: ORA  GI: Reg diet, , Zofran/Reglan PRN, Simethicone ATC  : bolton  GYN: 4x lap ports incl. 2x 10mm, vaginal sutures  PPX: SCDs    O+ 5/13    Will heplock, remove bolton, f/u TOV, encourage ambulation and PO intake. Likely for DC in the afternoon today.

## 2024-05-14 NOTE — DISCHARGE NOTE PROVIDER - HOSPITAL COURSE
Patient underwent an uncomplicated L/S NIESHA, BS, sacrocolpopexy, cystoscopy.  Patient’s postoperative course was unremarkable and she remained hemodynamically stable and afebrile throughout. Upon discharge the patient is ambulating and voiding spontaneously, tolerating oral intake, pain was well controlled with oral medication, and vital signs were stable.   Patient underwent an uncomplicated L/S NIESHA, BS, sacrocolpopexy, cystoscopy.  Patient’s postoperative course was unremarkable and she remained hemodynamically stable and afebrile throughout. Upon discharge the patient is ambulating and voiding spontaneously, tolerating oral intake, pain was well controlled with oral medication, and vital signs were stable.  2 week postop appointment scheduled for May 28th at 0945am at Dr. Silva's office

## 2024-05-14 NOTE — DISCHARGE NOTE PROVIDER - NSDCMRMEDTOKEN_GEN_ALL_CORE_FT
acetaminophen 500 mg oral tablet: 2 tab(s) orally every 6 hours As needed Mild Pain (1 - 3)  ibuprofen 600 mg oral tablet: 1 tab(s) orally every 6 hours As needed Mild Pain (1 - 3)  simethicone 80 mg oral tablet, chewable: 1 tab(s) orally every 6 hours

## 2024-05-14 NOTE — PROGRESS NOTE ADULT - SUBJECTIVE AND OBJECTIVE BOX
Pt seen and examined at bedside. Pt states mild abdominal pain. Pt has not yet tried ambulating, tolerating clear liquid diet, hasn't yet passed flatus, urinating adequately.   Pt denies fever, chills, chest pain, SOB, nausea, vomiting, lightheadedness, dizziness.      T(F): 98.1 (05-14-24 @ 05:33), Max: 98.1 (05-14-24 @ 05:33)  HR: 90 (05-14-24 @ 05:33) (73 - 94)  BP: 100/62 (05-14-24 @ 05:33) (91/52 - 138/80)  RR: 17 (05-14-24 @ 05:33) (16 - 18)  SpO2: 94% (05-14-24 @ 05:33) (94% - 99%)  I&O's Summary    13 May 2024 07:01  -  14 May 2024 06:26  --------------------------------------------------------  IN: 1250 mL / OUT: 1980 mL / NET: -730 mL        MEDICATIONS  (STANDING):  acetaminophen     Tablet .. 1000 milliGRAM(s) Oral every 6 hours  ketorolac   Injectable 15 milliGRAM(s) IV Push every 6 hours  lactated ringers. 1000 milliLiter(s) (125 mL/Hr) IV Continuous <Continuous>  simethicone 80 milliGRAM(s) Chew every 6 hours    MEDICATIONS  (PRN):  acetaminophen     Tablet .. 1000 milliGRAM(s) Oral every 6 hours PRN Mild Pain (1 - 3)  ibuprofen  Tablet. 600 milliGRAM(s) Oral every 6 hours PRN Mild Pain (1 - 3)  metoclopramide Injectable 10 milliGRAM(s) IV Push every 6 hours PRN N/V second line  ondansetron Injectable 8 milliGRAM(s) IV Push every 6 hours PRN N/V first line  oxyCODONE    IR 5 milliGRAM(s) Oral every 6 hours PRN Moderate Pain (4 - 6)  oxyCODONE    IR 10 milliGRAM(s) Oral every 6 hours PRN Severe Pain (7 - 10)      Physical Exam:  Constitutional: NAD  Pulmonary: no incr. WOB  Abdomen: incision sites clean, dry, intact. Soft, mildly tender, non-distended, no guarding, no rebound, +bowel sounds  Extremities: no lower extremity edema or calf tenderness. SCDs in place     LABS:                RADIOLOGY & ADDITIONAL TESTS:    
Pt seen and examined at bedside. Pt complains of mild abdominal pain with movement.   Pt denies any fever, chills, chest pain, SOB, nausea or vomiting     T(F): 97.4 (05-13-24 @ 20:31), Max: 97.8 (05-13-24 @ 19:34)  HR: 94 (05-13-24 @ 20:31) (73 - 94)  BP: 105/57 (05-13-24 @ 20:31) (91/52 - 138/80)  RR: 17 (05-13-24 @ 20:31) (16 - 18)  SpO2: 95% (05-13-24 @ 20:31) (95% - 99%)    05-13 @ 07:01  -  05-13 @ 22:48  --------------------------------------------------------  IN: 250 mL / OUT: 30 mL / NET: 220 mL        acetaminophen     Tablet .. 1000 milliGRAM(s) Oral every 6 hours  acetaminophen     Tablet .. 1000 milliGRAM(s) Oral every 6 hours PRN Mild Pain (1 - 3)  HYDROmorphone  Injectable 0.2 milliGRAM(s) IV Push every 15 minutes PRN Severe Pain (7 - 10)  ibuprofen  Tablet. 600 milliGRAM(s) Oral every 6 hours PRN Mild Pain (1 - 3)  ketorolac   Injectable 15 milliGRAM(s) IV Push every 6 hours  lactated ringers. 1000 milliLiter(s) IV Continuous <Continuous>  metoclopramide Injectable 10 milliGRAM(s) IV Push every 6 hours PRN N/V second line  ondansetron Injectable 8 milliGRAM(s) IV Push every 6 hours PRN N/V first line  oxyCODONE    IR 5 milliGRAM(s) Oral every 6 hours PRN Moderate Pain (4 - 6)  oxyCODONE    IR 10 milliGRAM(s) Oral every 6 hours PRN Severe Pain (7 - 10)  simethicone 80 milliGRAM(s) Chew every 6 hours      Physical exam:  Constitutional: NAD  Abdomen: incision sites clean, dry and intact. Soft, mildly tender, nondistended  Extremities: no lower extremity edema, or calve tenderness. SCDs in place    A:   78 s/p l/s NIESHA BS, sacrocolpopexy, intercede, posterior rectocele repair, cysto w/ jets b/l, methylene blue bladder backfill. Meeting postop milestones appropriately.     Neuro: Tylenol/Toradol 15 ATC, Oxy 5/10PRN  Cards: euvolemic  Resp: ORA  GI: Reg diet, Zofran/Reglan PRN, Simethicone ATC  : bolton  GYN: 4x lap ports incl. 2x 10mm, vaginal sutures  PPX: SCDs    F/u AM labs   Bolton out in AM

## 2024-05-14 NOTE — DISCHARGE NOTE NURSING/CASE MANAGEMENT/SOCIAL WORK - PATIENT PORTAL LINK FT
You can access the FollowMyHealth Patient Portal offered by [facility] by registering at the following website: [email or web address]. By joining [employer]’s FollowMyHealth portal, you will also be able to view your health information using other applications (apps) compatible with our system.

## 2024-05-14 NOTE — DISCHARGE NOTE PROVIDER - NSDCACTIVITY_GEN_ALL_CORE
Showering allowed/Walking - Indoors allowed/No heavy lifting/straining/Walking - Outdoors allowed/Follow Instructions Provided by your Surgical Team
No

## 2024-05-14 NOTE — DISCHARGE NOTE PROVIDER - NSDCCPTREATMENT_GEN_ALL_CORE_FT
PRINCIPAL PROCEDURE  Procedure: Laparoscopic supracervical hysterectomy with salpingectomy, sacrocolpopexy, and cystoscopy  Findings and Treatment:       SECONDARY PROCEDURE  Procedure: Posterior repair  Findings and Treatment:     Procedure: Laparoscopic supracervical hysterectomy with salpingectomy, sacrocolpopexy, and cystoscopy  Findings and Treatment:

## 2024-05-16 ENCOUNTER — NON-APPOINTMENT (OUTPATIENT)
Age: 79
End: 2024-05-16

## 2024-05-16 LAB — BACTERIA UR CULT: NORMAL

## 2024-05-24 DIAGNOSIS — N81.3 COMPLETE UTEROVAGINAL PROLAPSE: ICD-10-CM

## 2024-05-24 DIAGNOSIS — Z87.891 PERSONAL HISTORY OF NICOTINE DEPENDENCE: ICD-10-CM

## 2024-05-24 DIAGNOSIS — N84.1 POLYP OF CERVIX UTERI: ICD-10-CM

## 2024-05-24 DIAGNOSIS — Z87.440 PERSONAL HISTORY OF URINARY (TRACT) INFECTIONS: ICD-10-CM

## 2024-05-24 LAB — SURGICAL PATHOLOGY STUDY: SIGNIFICANT CHANGE UP

## 2024-05-28 ENCOUNTER — APPOINTMENT (OUTPATIENT)
Dept: UROGYNECOLOGY | Facility: CLINIC | Age: 79
End: 2024-05-28
Payer: MEDICARE

## 2024-05-28 VITALS — SYSTOLIC BLOOD PRESSURE: 124 MMHG | OXYGEN SATURATION: 97 % | DIASTOLIC BLOOD PRESSURE: 77 MMHG | HEART RATE: 87 BPM

## 2024-05-28 DIAGNOSIS — Z09 ENCOUNTER FOR FOLLOW-UP EXAMINATION AFTER COMPLETED TREATMENT FOR CONDITIONS OTHER THAN MALIGNANT NEOPLASM: ICD-10-CM

## 2024-05-28 PROBLEM — N81.4 UTEROVAGINAL PROLAPSE, UNSPECIFIED: Chronic | Status: ACTIVE | Noted: 2024-05-10

## 2024-05-28 PROCEDURE — 51798 US URINE CAPACITY MEASURE: CPT

## 2024-05-28 PROCEDURE — 99024 POSTOP FOLLOW-UP VISIT: CPT

## 2024-05-28 NOTE — DISCUSSION/SUMMARY
[Post-Op instructions given. Pt/family verbalizes understanding] : post-operative instructions were provided to the patient/family who verbalize understanding [FreeTextEntry1] :  Accession:                             [id number]  Collected Date/Time:                   5/13/2024 13:25 EDT Received Date/Time:                    5/14/2024 08:14 EDT  Surgical Pathology Report - Auth (Verified)  Specimen(s) Submitted 1  Cervical polyp 2  Right fallopian tube 3  Left fallopian tube 4  Uterus  Final Diagnosis 1.  Cervical polyp:  -   Endocervical polyp with extensive immature squamous metaplasia, reactive atypia, and marked mostly chronic inflammation (see note).  2.  Right fallopian tube: -   Fallopian tube, without significant microscopic abnormalities.  3.  Left fallopian tube: -   Fallopian tube, without significant microscopic abnormalities.  4.  Uterus, supracervical hysterectomy: -   Benign uterus with atrophic endometrium and adenomyosis. -   Intramural leiomyoma, 1 cm.  Note: Reactive appearing atypia is compatible with prolapse.  IHC will be reported in an addendum. Verified by: Isela Brown M.D. (Electronic Signature) Reported on: 05/24/24 19:39 EDT, [facility], [address] [place] [address] Phone: [phone]   Fax: [phone] _________________________________________________________________   Clinical Information Cystocele, urinary incontinence, incomplete uterovaginal prolapse  Gross Description 1.  Received: In formalin labeled  "cervical polyp" Size:  One fragment measuring 1.2 x 1.2 x 0.5 cm Description:  Purple-gray glistening soft tissue Additional Comments:   The specimen is bisected. Submitted:  Entirely in one cassette: 1A  2.  Received: In formalin labeled  "right fallopian tube" Size:  7.0 x 0.6 cm, fimbriated Serosa:  Pink-violet and smooth Lumen:  Pinpoint Submitted:  Representative sections in 2 cassettes: 2A: Fimbria 2B: Cross-sections  3.  Received: In formalin labeled  "left fallopian tube" Size:  7.0 x 0.4 -0.7 cm, fimbriated Serosa:  Pink-violet and smooth Lumen:  Pinpoint Submitted:  Representative sections in 2 cassettes: 3A: Fimbria 3B: Cross-sections   DOING WELL  DENIES  [facility] DOING WELL FU  IN 2-3 M

## 2024-05-30 ENCOUNTER — NON-APPOINTMENT (OUTPATIENT)
Age: 79
End: 2024-05-30

## 2024-05-30 LAB — BACTERIA UR CULT: NORMAL

## 2024-06-03 ENCOUNTER — NON-APPOINTMENT (OUTPATIENT)
Age: 79
End: 2024-06-03

## 2024-08-08 ENCOUNTER — APPOINTMENT (OUTPATIENT)
Dept: UROGYNECOLOGY | Facility: CLINIC | Age: 79
End: 2024-08-08

## 2024-08-08 PROCEDURE — 99459 PELVIC EXAMINATION: CPT

## 2024-08-08 PROCEDURE — 81002 URINALYSIS NONAUTO W/O SCOPE: CPT

## 2024-08-08 PROCEDURE — 99213 OFFICE O/P EST LOW 20 MIN: CPT | Mod: 24

## 2024-08-08 NOTE — ASSESSMENT
[FreeTextEntry1] : 4 mo sp  s/p L/s NIESHA, BS, sacrocolpopexy doing well  resumed reg activates and sex  no issues on bleeding or pain fu  as needed  resume cre with [facility]

## 2024-08-08 NOTE — PHYSICAL EXAM
[Chaperone Present] : A chaperone was present in the examining room during all aspects of the physical examination [No Acute Distress] : in no acute distress [Well developed] : well developed [Normal Mood/Affect] : mood and affect are normal [Vulvar Atrophy] : vulvar atrophy [Atrophy] : atrophy [Normal] : normal [Absent] : absent [83126] : A chaperone was present during the pelvic exam. [FreeTextEntry2] : Dr Ogden [Aa ____] : Aa [unfilled] [Ba ____] : Ba [unfilled] [C ____] : C [unfilled] [GH ____] : GH [unfilled] [TVL ____] : TVL  [unfilled] [Ap ____] : Ap [unfilled] [Bp ____] : Bp [unfilled] [D ____] : D [unfilled] [de-identified] : cst neg

## 2024-08-08 NOTE — HISTORY OF PRESENT ILLNESS
[FreeTextEntry1] : 79 yo presenting for follow up s/p L/s NIESHA, BHS, sacrocolpopexy on 5/13. Patient reports doing well. She has resumed intercourse without pain. She has begun exercising without difficulty. Denies fevers/chills/hematuria. Denies dysuria, leakage.

## 2024-08-08 NOTE — DISCUSSION/SUMMARY
[FreeTextEntry1] : 80 yo s/p l/s NIESHA BS sacrocolpopexy in May 2023. Pt doing well. No complaints today. Surgical site well healed.  Plan to return to clinic in 1 year or sooner PRN.  Melvi PGY3 seen and evaluated with Dr Silva

## 2025-01-08 ENCOUNTER — APPOINTMENT (OUTPATIENT)
Dept: UROGYNECOLOGY | Facility: CLINIC | Age: 80
End: 2025-01-08
Payer: MEDICARE

## 2025-01-08 ENCOUNTER — NON-APPOINTMENT (OUTPATIENT)
Age: 80
End: 2025-01-08

## 2025-01-08 VITALS
OXYGEN SATURATION: 93 % | SYSTOLIC BLOOD PRESSURE: 135 MMHG | WEIGHT: 120 LBS | BODY MASS INDEX: 18.19 KG/M2 | HEIGHT: 68 IN | DIASTOLIC BLOOD PRESSURE: 83 MMHG | HEART RATE: 73 BPM

## 2025-01-08 PROCEDURE — 51701 INSERT BLADDER CATHETER: CPT

## 2025-01-08 PROCEDURE — 81003 URINALYSIS AUTO W/O SCOPE: CPT | Mod: QW

## 2025-01-08 PROCEDURE — 99214 OFFICE O/P EST MOD 30 MIN: CPT | Mod: 25

## 2025-01-08 PROCEDURE — 99459 PELVIC EXAMINATION: CPT

## 2025-01-08 RX ORDER — SULFAMETHOXAZOLE AND TRIMETHOPRIM 800; 160 MG/1; MG/1
800-160 TABLET ORAL
Qty: 6 | Refills: 10 | Status: ACTIVE | COMMUNITY
Start: 2025-01-08 | End: 1900-01-01

## 2025-01-09 ENCOUNTER — NON-APPOINTMENT (OUTPATIENT)
Age: 80
End: 2025-01-09

## 2025-01-09 LAB
BILIRUB UR QL STRIP: NEGATIVE
GLUCOSE UR-MCNC: NEGATIVE
HCG UR QL: 0.2 EU/DL
HGB UR QL STRIP.AUTO: NEGATIVE
KETONES UR-MCNC: NEGATIVE
LEUKOCYTE ESTERASE UR QL STRIP: NORMAL
NITRITE UR QL STRIP: POSITIVE
PH UR STRIP: 6.5
PROT UR STRIP-MCNC: NEGATIVE
SP GR UR STRIP: 1.01

## 2025-01-12 LAB — BACTERIA UR CULT: ABNORMAL

## 2025-01-13 ENCOUNTER — APPOINTMENT (OUTPATIENT)
Dept: UROGYNECOLOGY | Facility: CLINIC | Age: 80
End: 2025-01-13
Payer: MEDICARE

## 2025-01-13 VITALS
DIASTOLIC BLOOD PRESSURE: 70 MMHG | HEART RATE: 86 BPM | SYSTOLIC BLOOD PRESSURE: 118 MMHG | BODY MASS INDEX: 18.19 KG/M2 | OXYGEN SATURATION: 98 % | HEIGHT: 68 IN | WEIGHT: 120 LBS

## 2025-01-13 VITALS
SYSTOLIC BLOOD PRESSURE: 127 MMHG | DIASTOLIC BLOOD PRESSURE: 75 MMHG | OXYGEN SATURATION: 97 % | WEIGHT: 120 LBS | BODY MASS INDEX: 18.25 KG/M2 | HEART RATE: 9 BPM

## 2025-01-13 PROCEDURE — 57160 INSERT PESSARY/OTHER DEVICE: CPT

## 2025-01-13 PROCEDURE — A4561: CPT

## 2025-01-13 RX ORDER — SULFAMETHOXAZOLE AND TRIMETHOPRIM 800; 160 MG/1; MG/1
800-160 TABLET ORAL
Qty: 6 | Refills: 10 | Status: ACTIVE | COMMUNITY
Start: 2025-01-13 | End: 1900-01-01

## 2025-01-28 ENCOUNTER — RESULT CHARGE (OUTPATIENT)
Age: 80
End: 2025-01-28

## 2025-01-28 ENCOUNTER — APPOINTMENT (OUTPATIENT)
Dept: UROGYNECOLOGY | Facility: CLINIC | Age: 80
End: 2025-01-28
Payer: MEDICARE

## 2025-01-28 VITALS — OXYGEN SATURATION: 97 % | HEART RATE: 86 BPM | DIASTOLIC BLOOD PRESSURE: 68 MMHG | SYSTOLIC BLOOD PRESSURE: 148 MMHG

## 2025-01-28 DIAGNOSIS — N81.2 INCOMPLETE UTEROVAGINAL PROLAPSE: ICD-10-CM

## 2025-01-28 PROCEDURE — 57160 INSERT PESSARY/OTHER DEVICE: CPT

## 2025-01-28 PROCEDURE — A4562: CPT

## 2025-01-29 ENCOUNTER — NON-APPOINTMENT (OUTPATIENT)
Age: 80
End: 2025-01-29

## 2025-01-29 LAB
BILIRUB UR QL STRIP: NORMAL
GLUCOSE UR-MCNC: NORMAL
HCG UR QL: NORMAL EU/DL
HGB UR QL STRIP.AUTO: NORMAL
KETONES UR-MCNC: NORMAL
LEUKOCYTE ESTERASE UR QL STRIP: NORMAL
NITRITE UR QL STRIP: NORMAL
PH UR STRIP: 6
PROT UR STRIP-MCNC: NORMAL
SP GR UR STRIP: 1.01

## 2025-02-03 ENCOUNTER — NON-APPOINTMENT (OUTPATIENT)
Age: 80
End: 2025-02-03

## 2025-02-03 LAB — BACTERIA UR CULT: NORMAL

## 2025-02-13 ENCOUNTER — APPOINTMENT (OUTPATIENT)
Dept: UROGYNECOLOGY | Facility: CLINIC | Age: 80
End: 2025-02-13
Payer: MEDICARE

## 2025-02-13 VITALS
SYSTOLIC BLOOD PRESSURE: 139 MMHG | HEIGHT: 68 IN | BODY MASS INDEX: 18.19 KG/M2 | WEIGHT: 120 LBS | HEART RATE: 79 BPM | DIASTOLIC BLOOD PRESSURE: 79 MMHG | OXYGEN SATURATION: 95 %

## 2025-02-13 DIAGNOSIS — N81.2 INCOMPLETE UTEROVAGINAL PROLAPSE: ICD-10-CM

## 2025-02-13 PROCEDURE — 99213 OFFICE O/P EST LOW 20 MIN: CPT

## 2025-05-09 ENCOUNTER — APPOINTMENT (OUTPATIENT)
Dept: OTOLARYNGOLOGY | Facility: CLINIC | Age: 80
End: 2025-05-09

## 2025-05-09 ENCOUNTER — APPOINTMENT (OUTPATIENT)
Dept: OTOLARYNGOLOGY | Facility: CLINIC | Age: 80
End: 2025-05-09
Payer: MEDICARE

## 2025-05-09 DIAGNOSIS — H91.13 PRESBYCUSIS, BILATERAL: ICD-10-CM

## 2025-05-09 DIAGNOSIS — F10.90 ALCOHOL USE, UNSPECIFIED, UNCOMPLICATED: ICD-10-CM

## 2025-05-09 DIAGNOSIS — Z78.9 OTHER SPECIFIED HEALTH STATUS: ICD-10-CM

## 2025-05-09 PROCEDURE — 92567 TYMPANOMETRY: CPT

## 2025-05-09 PROCEDURE — 92557 COMPREHENSIVE HEARING TEST: CPT

## 2025-05-09 PROCEDURE — 99203 OFFICE O/P NEW LOW 30 MIN: CPT

## 2025-05-09 RX ORDER — CYANOCOBALAMIN (VITAMIN B-12) 1000 MCG
TABLET ORAL
Refills: 0 | Status: ACTIVE | COMMUNITY

## 2025-05-09 RX ORDER — CALCIUM CITRATE/VITAMIN D3 315MG-6.25
TABLET ORAL
Refills: 0 | Status: ACTIVE | COMMUNITY

## 2025-05-30 PROBLEM — H90.3 SENSORINEURAL HEARING LOSS (SNHL) OF BOTH EARS: Status: ACTIVE | Noted: 2025-05-30

## (undated) DEVICE — Device

## (undated) DEVICE — POSITIONER FOAM EGG CRATE ULNAR 2PCS (PINK)

## (undated) DEVICE — GLV 6.5 PROTEXIS (WHITE)

## (undated) DEVICE — FOLEY CATH 3-WAY 18FR 30ML DOVER HYDROGEL COATED LATEX

## (undated) DEVICE — TROCAR COVIDIEN VERSAONE FIXATION CANNULA 5MM

## (undated) DEVICE — RUMI TIP BLUE 6.7MM X 8CM

## (undated) DEVICE — TUBING STRYKER PNEUMOCLEAR SMOKE EVACUATION HIGH FLOW

## (undated) DEVICE — VENODYNE/SCD SLEEVE CALF MEDIUM

## (undated) DEVICE — SUT MONOCRYL 4-0 27" PS-2 UNDYED

## (undated) DEVICE — ENDO LOOP LINA GOLD LOOP FOR LSH 200MM X 100MM LG CONNECTOR

## (undated) DEVICE — DRSG DERMABOND 0.7ML

## (undated) DEVICE — SUT VLOC 180 2-0 6" GS-22 GREEN

## (undated) DEVICE — NDL HYPO SAFE 22G X 1.5" (BLACK)

## (undated) DEVICE — TUBING TUR 2 PRONG

## (undated) DEVICE — D HELP - CLEARVIEW CLEARIFY SYSTEM

## (undated) DEVICE — SUT VICRYL 0 27" UR-6

## (undated) DEVICE — NDL GRASPING DISP

## (undated) DEVICE — SUT GORETEX CV-2 (0) 36" THX-26

## (undated) DEVICE — RUMI KOH-EFFICIENT STAINLESS STEEL 3.0CM

## (undated) DEVICE — DRAPE TOWEL BLUE STICKY

## (undated) DEVICE — PACK GYN WDC

## (undated) DEVICE — SOL IRR BAG NS 0.9% 3000ML

## (undated) DEVICE — TIP METZENBAUM SCISSOR MONOPOLAR ENDOCUT (ORANGE)

## (undated) DEVICE — APPLICATOR SURGICEL LAP TROCAR POINT 2.5MM X 150MM

## (undated) DEVICE — LIGASURE BLUNT TIP 37CM

## (undated) DEVICE — SPONGE ENDO PEANUT 5MM

## (undated) DEVICE — WARMING BLANKET UPPER ADULT

## (undated) DEVICE — FOLEY TRAY 16FR 5CC LF UMETER CLOSED

## (undated) DEVICE — POSITIONER PINK PAD PIGAZZI SYSTEM XL W ARM PROTECTOR

## (undated) DEVICE — INSUFFLATION NDL COVIDIEN SURGINEEDLE VERESS 120MM

## (undated) DEVICE — PACK PERI GYN

## (undated) DEVICE — UTERINE MANIPULATOR CONMED VCARE MED 34MM

## (undated) DEVICE — TROCAR COVIDIEN VERSAPORT BLADELESS OPTICAL 5MM STANDARD

## (undated) DEVICE — ENDOCATCH 10MM SPECIMEN POUCH